# Patient Record
Sex: FEMALE | Race: WHITE | URBAN - METROPOLITAN AREA
[De-identification: names, ages, dates, MRNs, and addresses within clinical notes are randomized per-mention and may not be internally consistent; named-entity substitution may affect disease eponyms.]

---

## 2021-09-14 ENCOUNTER — OFFICE VISIT (OUTPATIENT)
Dept: PRIMARY CARE CLINIC | Age: 21
End: 2021-09-14
Payer: COMMERCIAL

## 2021-09-14 VITALS
TEMPERATURE: 98.2 F | SYSTOLIC BLOOD PRESSURE: 113 MMHG | DIASTOLIC BLOOD PRESSURE: 72 MMHG | HEART RATE: 76 BPM | OXYGEN SATURATION: 99 %

## 2021-09-14 DIAGNOSIS — Z87.09 HISTORY OF ASTHMA: ICD-10-CM

## 2021-09-14 DIAGNOSIS — R05.9 COUGH: ICD-10-CM

## 2021-09-14 DIAGNOSIS — R07.89 FEELING OF CHEST TIGHTNESS: Primary | ICD-10-CM

## 2021-09-14 PROCEDURE — 99203 OFFICE O/P NEW LOW 30 MIN: CPT | Performed by: NURSE PRACTITIONER

## 2021-09-14 RX ORDER — ADAPALENE AND BENZOYL PEROXIDE 3; 25 MG/G; MG/G
GEL TOPICAL
COMMUNITY

## 2021-09-14 RX ORDER — PREDNISONE 20 MG/1
20 TABLET ORAL 2 TIMES DAILY
Qty: 10 TABLET | Refills: 0 | Status: SHIPPED | OUTPATIENT
Start: 2021-09-14 | End: 2021-09-19

## 2021-09-14 RX ORDER — IPRATROPIUM BROMIDE AND ALBUTEROL SULFATE 2.5; .5 MG/3ML; MG/3ML
3 SOLUTION RESPIRATORY (INHALATION)
COMMUNITY

## 2021-09-14 RX ORDER — ALBUTEROL SULFATE 90 UG/1
AEROSOL, METERED RESPIRATORY (INHALATION)
COMMUNITY

## 2021-09-14 RX ORDER — FLUTICASONE PROPIONATE 50 MCG
2 SPRAY, SUSPENSION (ML) NASAL DAILY
Qty: 16 G | Refills: 0 | Status: SHIPPED | OUTPATIENT
Start: 2021-09-14

## 2021-09-14 RX ORDER — TAZAROTENE 1 MG/G
AEROSOL, FOAM TOPICAL
COMMUNITY

## 2021-09-14 RX ORDER — DOXYCYCLINE 40 MG/1
CAPSULE ORAL
COMMUNITY

## 2021-09-14 RX ORDER — SELENIUM 50 MCG
TABLET ORAL
COMMUNITY

## 2021-09-14 RX ORDER — ACETAMINOPHEN 500 MG
TABLET ORAL
COMMUNITY

## 2021-09-14 RX ORDER — DAPSONE 75 MG/G
GEL TOPICAL
COMMUNITY

## 2021-09-14 ASSESSMENT — ENCOUNTER SYMPTOMS
RHINORRHEA: 0
WHEEZING: 0
COUGH: 1
SORE THROAT: 0
HEARTBURN: 0
HEMOPTYSIS: 0
SHORTNESS OF BREATH: 0

## 2021-09-14 NOTE — PROGRESS NOTES
[facility] PHYSICIANS  [facility] IN CARE  [address] [id number]  Dept: [phone]  Dept Fax: [phone]    Cyndee Cabrera is a 24 y.o. female who presents to the urgent care today for her medical conditions/complaints as notedbelow. Cyndee Cabrera is c/o of Chest Congestion (4 weeks ago) and Cough (hurts to cough)      HPI:     24 yr old female presents with hx cough and  uri sx for 3 weeks. Originally very congested both nasal and chest, now congestion relieved but chest feels a little tight. Using inhaler a little more than usual, does not need refills. Was taking expectorant and nyquil until sx improved. Attends [employer], is . Hx asthma, using inhlaer more fx. No SOB or wheezing, no pain with breathing. Fully covid vaccinated.    + hx pneumonia and covid   Lifelong nonsmoker  Here for chest tightness. Reports feels well. Cough  This is a new problem. The current episode started 1 to 4 weeks ago. The problem has been gradually improving. The problem occurs hourly. The cough is non-productive. Associated symptoms include postnasal drip. Pertinent negatives include no chest pain, chills, ear congestion, ear pain, fever, headaches, heartburn, hemoptysis, myalgias, nasal congestion, rash, rhinorrhea, sore throat, shortness of breath, sweats, weight loss or wheezing. The symptoms are aggravated by exercise. She has tried a beta-agonist inhaler for the symptoms. The treatment provided mild relief. Her past medical history is significant for asthma, bronchitis and pneumonia. There is no history of bronchiectasis, COPD, emphysema or environmental allergies. No past medical history on file.      Current Outpatient Medications   Medication Sig Dispense Refill    fluticasone (FLONASE) 50 MCG/ACT nasal spray 2 sprays by Nasal route daily 16 g 0    predniSONE (DELTASONE) 20 MG tablet Take 1 tablet by mouth 2 times daily for 5 days 10 tablet 0    Tazarotene 0.1 % FOAM Fabior 0.1 % topical foam      metFORMIN (GLUCOPHAGE) 500 MG tablet       ipratropium-albuterol (DUONEB) 0.5-2.5 (3) MG/3ML SOLN nebulizer solution 3 mLs      doxycycline (ORACEA) 40 MG delayed release capsule doxycycline monohydrate 40 mg capsule,immediate - delay release      dapsone (ACZONE) 7.5 % GEL topical gel Aczone 7.5 % topical gel with pump   APPLY AS DIRECTED ONCE DAILY (90 DAY SUPPLY PER MD)      vitamin D (CHOLECALCIFEROL) 25 MCG (1000 UT) TABS tablet Vitamin D      Calcium Carbonate-Vitamin D (OYSTER SHELL CALCIUM/D) 250-125 MG-UNIT TABS Take 1 tablet by mouth daily      aluminum chloride (DRYSOL) 20 % external solution Drysol Dab-O-Matic 20 % topical solution      albuterol sulfate  (90 Base) MCG/ACT inhaler ProAir HFA 90 mcg/actuation aerosol inhaler   INHALE 2-4 PUFFS BY MOUTH EVERY 4 TO 6 HOURS AS NEEDED (3-4 TIMES PER DAY) UNTIL NO COUGH FOR 1-2 DAYS      Adapalene-Benzoyl Peroxide 0.3-2.5 % GEL Epiduo Forte 0.3 %-2.5 % topical gel with pump   USE AS DIRECTED ONCE DAILY      acetaminophen (TYLENOL) 500 MG tablet        No current facility-administered medications for this visit. Allergies   Allergen Reactions    Penicillins     Seasonal        Subjective:      Review of Systems   Constitutional: Negative for chills, fever and weight loss. HENT: Positive for postnasal drip. Negative for ear pain, rhinorrhea and sore throat. Respiratory: Positive for cough. Negative for hemoptysis, shortness of breath and wheezing. Cardiovascular: Negative for chest pain. Gastrointestinal: Negative for heartburn. Musculoskeletal: Negative for myalgias. Skin: Negative for rash. Allergic/Immunologic: Negative for environmental allergies. Neurological: Negative for headaches. All other systems reviewed and are negative. 14 systems reviewed and negative except as listed in HPI. Objective:     Physical Exam  Vitals and nursing note reviewed.    Constitutional: General: She is not in acute distress. Appearance: Normal appearance. She is well-developed. She is not ill-appearing, toxic-appearing or diaphoretic. Comments: nontoxic   HENT:      Head: Normocephalic and atraumatic. Right Ear: Tympanic membrane, ear canal and external ear normal.      Left Ear: Tympanic membrane, ear canal and external ear normal.      Nose: Rhinorrhea present. Mouth/Throat:      Mouth: Mucous membranes are moist.      Pharynx: No oropharyngeal exudate or posterior oropharyngeal erythema. Comments: +PND  Swallows without difficulty  Eyes:      General: No scleral icterus. Right eye: No discharge. Left eye: No discharge. Extraocular Movements: Extraocular movements intact. Conjunctiva/sclera: Conjunctivae normal.      Pupils: Pupils are equal, round, and reactive to light. Cardiovascular:      Rate and Rhythm: Normal rate and regular rhythm. Pulses: Normal pulses. Heart sounds: Normal heart sounds. Pulmonary:      Effort: Pulmonary effort is normal. No respiratory distress. Breath sounds: Normal breath sounds. No stridor. No wheezing, rhonchi or rales. Chest:      Chest wall: No tenderness. Abdominal:      General: Bowel sounds are normal. There is no distension. Palpations: Abdomen is soft. Tenderness: There is no abdominal tenderness. Musculoskeletal:         General: No tenderness or deformity. Normal range of motion. Cervical back: Normal range of motion and neck supple. Lymphadenopathy:      Cervical: No cervical adenopathy. Skin:     General: Skin is warm and dry. Capillary Refill: Capillary refill takes less than 2 seconds. Findings: No rash ( no rash to visible skin). Neurological:      General: No focal deficit present. Mental Status: She is alert and oriented to person, place, and time. Motor: No abnormal muscle tone.       Coordination: Coordination normal.   Psychiatric: Mood and Affect: Mood normal.         Behavior: Behavior normal.       /72 (Site: Right Upper Arm, Position: Sitting)   Pulse 76   Temp 98.2 °F (36.8 °C)   SpO2 99%     Assessment:       Diagnosis Orders   1. Cough     2. History of asthma         Plan:    very well appearing female, reports uri sx improved and now just some chest tightness with np cough  No fevers, stopped meds for sx, no wheezing or sob  Declines covid testing though aware covid like sx and can have covid breakthrough even with her full vaccination  ls clr t/o, spo2 99%, no fevers and feels well, cough minmal an not keeping her up at night. tx chest tightness with asthma with  pred rx  flonase for pnd  Cont inhaler use  Make sure getting plenty rest, push fluids, try airborn or other immune booster  Return worse, new sx or concerns  Attends [employer], [employer] [facility]  Sx worsen or fail to improve then consider chest xray  Return if symptoms worsen or fail to improve, for Make an Appt. with your Primary Care in 1 week. Orders Placed This Encounter   Medications    fluticasone (FLONASE) 50 MCG/ACT nasal spray     Si sprays by Nasal route daily     Dispense:  16 g     Refill:  0    predniSONE (DELTASONE) 20 MG tablet     Sig: Take 1 tablet by mouth 2 times daily for 5 days     Dispense:  10 tablet     Refill:  0         Patient given educational materials - see patient instructions. Discussed use, benefit, and side effects of prescribed medications. All patient questions answered. Pt voicedunderstanding.     Electronically signed by RUFINO Mcnally CNP on 2021 at 5:14 PM

## 2021-09-14 NOTE — PROGRESS NOTES
Visit Information    Have you changed or started any medications since your last visit including any over-the-counter medicines, vitamins, or herbal medicines? no   Are you having any side effects from any of your medications? no  Have you stopped taking any of your medications? Is so, why? -  no    Have you seen any other physician or provider since your last visit? No  Have you had any other diagnostic tests since your last visit? No  Have you been seen in the emergency room and/or had an admission to a hospital since we last saw you? No  Have you had your routine dental cleaning in the past 6 months? no    Have you activated your [employer]loom account? If not, what are your barriers?  No:      No care team member to display    Medical History Review  Past Medical, Family, and Social History reviewed and does not contribute to the patient presenting condition    Health Maintenance   Topic Date Due    Hepatitis C screen  Never done    Varicella vaccine (1 of 2 - 2-dose childhood series) Never done    HPV vaccine (1 - 2-dose series) Never done    COVID-19 Vaccine (1) Never done    HIV screen  Never done    Chlamydia screen  Never done    DTaP/Tdap/Td vaccine (1 - Tdap) Never done    Pap smear  Never done    Flu vaccine (1) 09/01/2021    Hepatitis A vaccine  Aged Out    Hepatitis B vaccine  Aged Out    Hib vaccine  Aged Out    Meningococcal (ACWY) vaccine  Aged Out    Pneumococcal 0-64 years Vaccine  Aged [place]

## 2021-09-14 NOTE — PATIENT INSTRUCTIONS
breathing. Call your doctor now or seek immediate medical care if:    · You cough up blood.     · You have new or worse trouble breathing.     · You have a new or higher fever.     · You have a new rash. Watch closely for changes in your health, and be sure to contact your doctor if:    · You cough more deeply or more often, especially if you notice more mucus or a change in the color of your mucus.     · You have new symptoms, such as a sore throat, an earache, or sinus pain.     · You do not get better as expected. Where can you learn more? Go to https://chpepiceweb.[employer]. org and sign in to your [employer] account. Enter D279 in the kooldiner box to learn more about \"Cough: Care Instructions. \"     If you do not have an account, please click on the \"Sign Up Now\" link. Current as of: October 26, 2020               Content Version: 12.9  © 2006-2021 [employer]. Care instructions adapted under license by [facility] ([place], [place]) [employer] [facility] ([facility]). If you have questions about a medical condition or this instruction, always ask your healthcare professional. [address] [address] any warranty or liability for your use of this information. Patient Education        Viral Respiratory Infection: Care Instructions  Your Care Instructions     Viruses are very small organisms. They grow in number after they enter your body. There are many types that cause different illnesses, such as colds and the mumps. The symptoms of a viral respiratory infection often start quickly. They include a fever, sore throat, and runny nose. You may also just not feel well. Or you may not want to eat much. Most viral respiratory infections are not serious. They usually get better with time and self-care. Antibiotics are not used to treat a viral infection. That's because antibiotics will not help cure a viral illness.  In some cases, antiviral medicine can help your body fight a serious viral infection. Follow-up care is a key part of your treatment and safety. Be sure to make and go to all appointments, and call your doctor if you are having problems. It's also a good idea to know your test results and keep a list of the medicines you take. How can you care for yourself at home? · Rest as much as possible until you feel better. · Be safe with medicines. Take your medicine exactly as prescribed. Call your doctor if you think you are having a problem with your medicine. You will get more details on the specific medicine your doctor prescribes. · Take an over-the-counter pain medicine, such as acetaminophen (Tylenol), ibuprofen (Advil, Motrin), or naproxen (Aleve), as needed for pain and fever. Read and follow all instructions on the label. Do not give aspirin to anyone younger than 20. It has been linked to Reye syndrome, a serious illness. · Drink plenty of fluids. Hot fluids, such as tea or soup, may help relieve congestion in your nose and throat. If you have kidney, heart, or liver disease and have to limit fluids, talk with your doctor before you increase the amount of fluids you drink. · Try to clear mucus from your lungs by breathing deeply and coughing. · Gargle with warm salt water once an hour. This can help reduce swelling and throat pain. Use 1 teaspoon of salt mixed in 1 cup of warm water. · Do not smoke or allow others to smoke around you. If you need help quitting, talk to your doctor about stop-smoking programs and medicines. These can increase your chances of quitting for good. To avoid spreading the virus  · Cough or sneeze into a tissue. Then throw the tissue away. · If you don't have a tissue, use your hand to cover your cough or sneeze. Then clean your hand. You can also cough into your sleeve. · Wash your hands often. Use soap and warm water. Wash for 15 to 20 seconds each time.   · If you don't have soap and water near you, you can clean your hands with alcohol wipes or gel.  When should you call for help? Call your doctor now or seek immediate medical care if:    · You have a new or higher fever.     · Your fever lasts more than 48 hours.     · You have trouble breathing.     · You have a fever with a stiff neck or a severe headache.     · You are sensitive to light.     · You feel very sleepy or confused. Watch closely for changes in your health, and be sure to contact your doctor if:    · You do not get better as expected. Where can you learn more? Go to https://[employer]. org and sign in to your [employer] account. Enter S878 in the Keep Your Pharmacy Open box to learn more about \"Viral Respiratory Infection: Care Instructions. \"     If you do not have an account, please click on the \"Sign Up Now\" link. Current as of: October 26, 2020               Content Version: 12.9  © 6069-3078 [employer]. Care instructions adapted under license by [facility] ([facility]). If you have questions about a medical condition or this instruction, always ask your healthcare professional. [address] any warranty or liability for your use of this information.